# Patient Record
Sex: FEMALE | Race: WHITE | NOT HISPANIC OR LATINO | URBAN - METROPOLITAN AREA
[De-identification: names, ages, dates, MRNs, and addresses within clinical notes are randomized per-mention and may not be internally consistent; named-entity substitution may affect disease eponyms.]

---

## 2021-05-27 ENCOUNTER — LAB OUTSIDE AN ENCOUNTER (OUTPATIENT)
Dept: URBAN - METROPOLITAN AREA CLINIC 118 | Facility: CLINIC | Age: 31
End: 2021-05-27

## 2021-05-27 ENCOUNTER — OFFICE VISIT (OUTPATIENT)
Dept: URBAN - METROPOLITAN AREA CLINIC 118 | Facility: CLINIC | Age: 31
End: 2021-05-27
Payer: COMMERCIAL

## 2021-05-27 DIAGNOSIS — R11.2 INTRACTABLE VOMITING WITH NAUSEA, UNSPECIFIED VOMITING TYPE: ICD-10-CM

## 2021-05-27 DIAGNOSIS — R93.2 ABNORMAL CT OF LIVER: ICD-10-CM

## 2021-05-27 DIAGNOSIS — R10.11 RUQ ABDOMINAL PAIN: ICD-10-CM

## 2021-05-27 PROCEDURE — 99244 OFF/OP CNSLTJ NEW/EST MOD 40: CPT | Performed by: INTERNAL MEDICINE

## 2021-05-27 RX ORDER — GABAPENTIN 600 MG/1
1 TABLET TABLET, FILM COATED ORAL ONCE A DAY
Status: ACTIVE | COMMUNITY

## 2021-05-27 NOTE — HPI-TODAY'S VISIT:
The patient is a 30-year-old female referred by Dr. Barnett at [facility] for nausea, vomiting, and an abnormal CT scan.  Note was sent.  She was last seen several years ago for a cholecystectomy due to biliary dyskinesia.  She done extremely well for several years and has no history of significant functional bowel disorder.  She states in March she had acute onset nausea and vomiting that would occur in recurrent cycles.  It would last for a few days, then go away for about a week at a time.  There is associated indigestion, bloating, and epigastric pain.  She lost 15 pounds from food avoidance.  Laboratory studies and a CT scan at her primary care showed only hepatic hemangiomas and constipation.  Her bowel movements are generally regular without blood and she thinks the constipation only occurred because she was taking high-dose Zofran.  She does take chronic ibuprofen on a daily basis but denies significant indigestion, history of peptic ulcer disease, or melena.  The pain does radiate to the right upper quadrant near where the gallbladder was located.  An H. pylori breath test was negative at her primary care.  Currently her nausea is somewhat better with using Reglan in place of Zofran.

## 2021-05-28 LAB
ALBUMIN: 4.6
ALKALINE PHOSPHATASE: 50
ALT (SGPT): 5
AST (SGOT): 12
BILIRUBIN, DIRECT: 0.22
BILIRUBIN, TOTAL: 1
LIPASE: 27
PROTEIN, TOTAL: 6.8

## 2021-06-04 ENCOUNTER — TELEPHONE ENCOUNTER (OUTPATIENT)
Dept: URBAN - METROPOLITAN AREA CLINIC 92 | Facility: CLINIC | Age: 31
End: 2021-06-04

## 2021-06-04 ENCOUNTER — OFFICE VISIT (OUTPATIENT)
Dept: URBAN - METROPOLITAN AREA SURGERY CENTER 23 | Facility: SURGERY CENTER | Age: 31
End: 2021-06-04
Payer: COMMERCIAL

## 2021-06-04 DIAGNOSIS — Z79.1 ENCOUNTER FOR LONG-TERM (CURRENT) USE OF NSAIDS: ICD-10-CM

## 2021-06-04 DIAGNOSIS — R11.2 ACUTE NAUSEA WITH NONBILIOUS VOMITING: ICD-10-CM

## 2021-06-04 DIAGNOSIS — K31.89 ACQUIRED DEFORMITY OF DUODENUM: ICD-10-CM

## 2021-06-04 PROCEDURE — G8907 PT DOC NO EVENTS ON DISCHARG: HCPCS | Performed by: INTERNAL MEDICINE

## 2021-06-04 PROCEDURE — 43239 EGD BIOPSY SINGLE/MULTIPLE: CPT | Performed by: INTERNAL MEDICINE

## 2021-06-04 RX ORDER — PANTOPRAZOLE SODIUM 40 MG/1
1 TABLET TABLET, DELAYED RELEASE ORAL ONCE A DAY
Qty: 30 TABLET | Refills: 5 | OUTPATIENT
Start: 2021-06-04

## 2021-06-04 RX ORDER — GABAPENTIN 600 MG/1
1 TABLET TABLET, FILM COATED ORAL ONCE A DAY
Status: ACTIVE | COMMUNITY

## 2021-07-20 ENCOUNTER — OFFICE VISIT (OUTPATIENT)
Dept: URBAN - METROPOLITAN AREA CLINIC 118 | Facility: CLINIC | Age: 31
End: 2021-07-20
Payer: COMMERCIAL

## 2021-07-20 DIAGNOSIS — K58.1 IRRITABLE BOWEL SYNDROME WITH CONSTIPATION: ICD-10-CM

## 2021-07-20 DIAGNOSIS — K29.50 ANTRAL GASTRITIS: ICD-10-CM

## 2021-07-20 DIAGNOSIS — R10.84 GENERALIZED ABDOMINAL PAIN: ICD-10-CM

## 2021-07-20 DIAGNOSIS — N92.1 MENOMETRORRHAGIA: ICD-10-CM

## 2021-07-20 PROCEDURE — 99214 OFFICE O/P EST MOD 30 MIN: CPT | Performed by: INTERNAL MEDICINE

## 2021-07-20 RX ORDER — TRAZODONE HYDROCHLORIDE 50 MG/1
1 TABLET AT BEDTIME TABLET, FILM COATED ORAL ONCE A DAY
Qty: 30 TABLET | Refills: 5 | OUTPATIENT
Start: 2021-07-20

## 2021-07-20 RX ORDER — IBUPROFEN 800 MG/1
1 TABLET WITH FOOD OR MILK AS NEEDED TABLET ORAL THREE TIMES A DAY
Status: ACTIVE | COMMUNITY

## 2021-07-20 RX ORDER — PANTOPRAZOLE SODIUM 40 MG/1
1 TABLET TABLET, DELAYED RELEASE ORAL ONCE A DAY
Qty: 30 TABLET | Refills: 5 | Status: DISCONTINUED | COMMUNITY
Start: 2021-06-04

## 2021-07-20 RX ORDER — GABAPENTIN 600 MG/1
1 TABLET TABLET, FILM COATED ORAL ONCE A DAY
Status: ACTIVE | COMMUNITY

## 2021-07-20 RX ORDER — PANTOPRAZOLE SODIUM 40 MG/1
TAKE ONE TABLET BY MOUTH ONE TIME DAILY TABLET, DELAYED RELEASE ORAL
Qty: 30 | Refills: 0 | Status: ACTIVE | COMMUNITY

## 2021-07-20 NOTE — HPI-TODAY'S VISIT:
The patient is following up after her recent upper endoscopy and MRI scan. She has hepatic hemangiomas but no evidence of a mass lesion. Her upper endoscopy showed antral gastritis but was negative for H. pylori. She continues to use ibuprofen almost every day for headaches and arthralgias. She did try Protonix for her abdominal cramps and she stopped it because it seemed to worsen the abdominal cramps. She did start a probiotic and her chronic constipation is markedly better, she has a bowel movement almost every day. However she continues to complain of episodic migratory abdominal pain this pain is unrelated to the bowel movements but may may be related to her very irregular menstrual cycles. She has tried birth control pills in the past to regulate these, but they cause significant psychiatric distress. Her weight is stable without severe vomiting. Her main complaint is the migratory cramps.

## 2021-09-14 ENCOUNTER — OFFICE VISIT (OUTPATIENT)
Dept: URBAN - METROPOLITAN AREA CLINIC 118 | Facility: CLINIC | Age: 31
End: 2021-09-14

## 2021-09-16 ENCOUNTER — OFFICE VISIT (OUTPATIENT)
Dept: URBAN - METROPOLITAN AREA CLINIC 118 | Facility: CLINIC | Age: 31
End: 2021-09-16
Payer: COMMERCIAL

## 2021-09-16 DIAGNOSIS — K29.50 CHRONIC ANTRAL GASTRITIS: ICD-10-CM

## 2021-09-16 DIAGNOSIS — N92.1 MENOMETRORRHAGIA: ICD-10-CM

## 2021-09-16 DIAGNOSIS — K58.1 IRRITABLE BOWEL SYNDROME WITH CONSTIPATION: ICD-10-CM

## 2021-09-16 DIAGNOSIS — R10.84 GENERALIZED ABDOMINAL PAIN: ICD-10-CM

## 2021-09-16 PROBLEM — 440630006: Status: ACTIVE | Noted: 2021-07-21

## 2021-09-16 PROBLEM — 314631008: Status: ACTIVE | Noted: 2021-07-21

## 2021-09-16 PROCEDURE — 99213 OFFICE O/P EST LOW 20 MIN: CPT | Performed by: INTERNAL MEDICINE

## 2021-09-16 RX ORDER — IBUPROFEN 800 MG/1
1 TABLET WITH FOOD OR MILK AS NEEDED TABLET ORAL THREE TIMES A DAY
Status: ACTIVE | COMMUNITY

## 2021-09-16 RX ORDER — GABAPENTIN 600 MG/1
1 TABLET TABLET, FILM COATED ORAL ONCE A DAY
Status: ACTIVE | COMMUNITY

## 2021-09-16 RX ORDER — PANTOPRAZOLE SODIUM 40 MG/1
TAKE ONE TABLET BY MOUTH ONE TIME DAILY TABLET, DELAYED RELEASE ORAL
Qty: 30 | Refills: 0 | Status: ACTIVE | COMMUNITY

## 2021-09-16 RX ORDER — TRAZODONE HYDROCHLORIDE 50 MG/1
1 TABLET AT BEDTIME TABLET, FILM COATED ORAL ONCE A DAY
Qty: 30 TABLET | Refills: 5 | Status: DISCONTINUED | COMMUNITY
Start: 2021-07-20

## 2021-09-16 RX ORDER — HYOSCYAMINE SULFATE 0.125 MG
1 TABLET AS NEEDED TABLET,DISINTEGRATING ORAL
Qty: 60 TABLET | Refills: 5 | OUTPATIENT
Start: 2021-09-16 | End: 2022-03-15

## 2021-09-16 NOTE — HPI-TODAY'S VISIT:
The patient is following up after an office visit in July.  She tried trazodone for her abdominal cramps and possible irritable bowel syndrome.  This made her feel disoriented and she still had bilateral lower quadrant cramps.  She has stopped this and the disorientation has resolved.  Her bowel movements are regular to constipated but she is inconsistent in using her milk of magnesia.  However if she does feel constipated, and uses the milk of magnesia, she has a bowel movement without straining or blood.  Her abdominal discomfort continues to be in the bilateral lower quadrants and is associated with gas and bloating.  Her cycles continue to be heavy, and she is scheduled for a pelvic ultrasound to evaluate for endometriosis by her gynecologist.  She does have intermittent days where the pain is not present and there is no nausea, bloating, or gas production.  The cycles of pain almost always last for a week or more, but she will have multiple days when she does not have the pain or nausea; her menstrual cycles do appear to worsen the pain.

## 2021-10-28 ENCOUNTER — OFFICE VISIT (OUTPATIENT)
Dept: URBAN - METROPOLITAN AREA CLINIC 118 | Facility: CLINIC | Age: 31
End: 2021-10-28
Payer: COMMERCIAL

## 2021-10-28 DIAGNOSIS — K59.04 CHRONIC IDIOPATHIC CONSTIPATION: ICD-10-CM

## 2021-10-28 DIAGNOSIS — R10.31 RIGHT LOWER QUADRANT PAIN: ICD-10-CM

## 2021-10-28 DIAGNOSIS — K29.50 CHRONIC ANTRAL GASTRITIS: ICD-10-CM

## 2021-10-28 DIAGNOSIS — R10.32 LEFT LOWER QUADRANT PAIN: ICD-10-CM

## 2021-10-28 PROCEDURE — 99214 OFFICE O/P EST MOD 30 MIN: CPT | Performed by: INTERNAL MEDICINE

## 2021-10-28 RX ORDER — HYOSCYAMINE SULFATE 0.125 MG
1 TABLET AS NEEDED TABLET,DISINTEGRATING ORAL
Qty: 60 TABLET | Refills: 5 | Status: DISCONTINUED | COMMUNITY
Start: 2021-09-16 | End: 2022-03-15

## 2021-10-28 RX ORDER — GABAPENTIN 600 MG/1
1 TABLET TABLET, FILM COATED ORAL ONCE A DAY
Status: DISCONTINUED | COMMUNITY

## 2021-10-28 RX ORDER — IBUPROFEN 800 MG/1
1 TABLET WITH FOOD OR MILK AS NEEDED TABLET ORAL THREE TIMES A DAY
Status: ACTIVE | COMMUNITY

## 2021-10-28 RX ORDER — PANTOPRAZOLE SODIUM 40 MG/1
TAKE ONE TABLET BY MOUTH ONE TIME DAILY TABLET, DELAYED RELEASE ORAL
Qty: 30 | Refills: 0 | Status: ACTIVE | COMMUNITY

## 2021-10-28 NOTE — HPI-TODAY'S VISIT:
The patient is following up after recent pelvic ultrasound.  This was negative and showed no evidence of endometriosis, severe fibroids, or ovarian lesions.  Her constipation is well controlled and she is only using milk of magnesia as needed with a bowel movement almost every day.  She still has pain in the bilateral lower quadrants not related to her menstrual cycles.  It is not relieved with improvement in her bowel movements.  She has stopped her gabapentin out of the concerned that it was worsening her constipation.  She has decided to proceed with exploratory surgery to assess for endometriosis or pelvic adhesions.  Her Protonix works well for her antral gastritis.  She has no severe dyspepsia, dysphagia, odynophagia, nausea, or vomiting.

## 2021-10-29 ENCOUNTER — DASHBOARD ENCOUNTERS (OUTPATIENT)
Age: 31
End: 2021-10-29

## 2021-10-29 PROBLEM — 82934008: Status: ACTIVE | Noted: 2021-10-29

## 2021-10-29 PROBLEM — 89790007: Status: ACTIVE | Noted: 2021-09-16
